# Patient Record
Sex: MALE | Race: WHITE | NOT HISPANIC OR LATINO | Employment: FULL TIME | URBAN - METROPOLITAN AREA
[De-identification: names, ages, dates, MRNs, and addresses within clinical notes are randomized per-mention and may not be internally consistent; named-entity substitution may affect disease eponyms.]

---

## 2023-05-12 ENCOUNTER — TELEPHONE (OUTPATIENT)
Dept: PRIMARY CARE | Facility: CLINIC | Age: 68
End: 2023-05-12

## 2023-05-13 DIAGNOSIS — Z00.00 HEALTH CARE MAINTENANCE: Primary | ICD-10-CM

## 2023-05-13 RX ORDER — AMOXICILLIN 875 MG/1
875 TABLET, FILM COATED ORAL 2 TIMES DAILY
Qty: 10 TABLET | Refills: 0 | Status: SHIPPED | OUTPATIENT
Start: 2023-05-13 | End: 2023-05-18

## 2023-11-20 ENCOUNTER — OFFICE VISIT (OUTPATIENT)
Dept: PRIMARY CARE | Facility: CLINIC | Age: 68
End: 2023-11-20
Payer: MEDICARE

## 2023-11-20 VITALS — SYSTOLIC BLOOD PRESSURE: 115 MMHG | DIASTOLIC BLOOD PRESSURE: 73 MMHG

## 2023-11-20 DIAGNOSIS — E11.9 TYPE 2 DIABETES MELLITUS WITHOUT COMPLICATION, WITHOUT LONG-TERM CURRENT USE OF INSULIN (MULTI): ICD-10-CM

## 2023-11-20 DIAGNOSIS — I10 PRIMARY HYPERTENSION: ICD-10-CM

## 2023-11-20 DIAGNOSIS — Z00.00 HEALTH CARE MAINTENANCE: Primary | ICD-10-CM

## 2023-11-20 DIAGNOSIS — J01.00 ACUTE MAXILLARY SINUSITIS, RECURRENCE NOT SPECIFIED: ICD-10-CM

## 2023-11-20 PROCEDURE — 3074F SYST BP LT 130 MM HG: CPT | Performed by: INTERNAL MEDICINE

## 2023-11-20 PROCEDURE — 1036F TOBACCO NON-USER: CPT | Performed by: INTERNAL MEDICINE

## 2023-11-20 PROCEDURE — 99213 OFFICE O/P EST LOW 20 MIN: CPT | Performed by: INTERNAL MEDICINE

## 2023-11-20 PROCEDURE — 1159F MED LIST DOCD IN RCRD: CPT | Performed by: INTERNAL MEDICINE

## 2023-11-20 PROCEDURE — 3078F DIAST BP <80 MM HG: CPT | Performed by: INTERNAL MEDICINE

## 2023-11-20 PROCEDURE — 1126F AMNT PAIN NOTED NONE PRSNT: CPT | Performed by: INTERNAL MEDICINE

## 2023-11-20 PROCEDURE — 4010F ACE/ARB THERAPY RXD/TAKEN: CPT | Performed by: INTERNAL MEDICINE

## 2023-11-20 RX ORDER — AMOXICILLIN AND CLAVULANATE POTASSIUM 875; 125 MG/1; MG/1
875 TABLET, FILM COATED ORAL 2 TIMES DAILY
Qty: 14 TABLET | Refills: 0 | Status: SHIPPED | OUTPATIENT
Start: 2023-11-20 | End: 2023-11-27

## 2023-11-20 RX ORDER — PENICILLIN V POTASSIUM 500 MG/1
TABLET, FILM COATED ORAL
Qty: 20 TABLET | Refills: 0 | Status: SHIPPED | OUTPATIENT
Start: 2023-11-20

## 2023-11-21 NOTE — PROGRESS NOTES
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    HPI sick visit same day after hours no staff no chest pain no shortness of breath complains of some sinus and posterior pharyngeal discomfort discussed with blood sugars no polyuria polydipsia no hypoglycemic symptoms     vital signs noted alert and oriented x 3 NCAT no coryza mild if any nares clear discharge OP benign erythema posterior no exudate TM normal opaque bilateral EAC clear bilateral no AC nodes    Review of Systems    Objective   There were no vitals taken for this visit.    Physical Exam no JVD or bruit chest clear to auscultation and percussion no wheezing no crackles CV regular rate and rhythm S1-S2 without murmur gallop or rub extremities no clubbing cyanosis or edema normal distal pulses    Assessment/Plan impression Sino pharyngitis diabetes mellitus hypertension   Plan recheck for regular physical examination and other blood work as needed does not have uvulitis but has TEDDY and some upper respiratory infection did start Augmentin previously so will do 7 more days Augmentin that he has leftover also needs Amoxil for dentistry plan to intervention good diet regular exercise increase water consumption check on blood sugars at home continue with blood pressure medication follow-up as above

## 2023-12-05 PROBLEM — N40.1 BPH WITH OBSTRUCTION/LOWER URINARY TRACT SYMPTOMS: Status: ACTIVE | Noted: 2023-12-05

## 2023-12-05 PROBLEM — I83.893 VARICOSE VEINS OF BOTH LOWER EXTREMITIES WITH COMPLICATIONS: Status: ACTIVE | Noted: 2023-12-05

## 2023-12-05 PROBLEM — I10 HYPERTENSION: Status: ACTIVE | Noted: 2023-12-05

## 2023-12-05 PROBLEM — G47.33 OBSTRUCTIVE SLEEP APNEA: Status: ACTIVE | Noted: 2023-12-05

## 2023-12-05 PROBLEM — H25.13 AGE-RELATED NUCLEAR CATARACT, BILATERAL: Status: ACTIVE | Noted: 2023-12-05

## 2023-12-05 PROBLEM — I25.10 ASHD (ARTERIOSCLEROTIC HEART DISEASE): Status: ACTIVE | Noted: 2023-12-05

## 2023-12-05 PROBLEM — G47.00 INSOMNIA: Status: ACTIVE | Noted: 2023-12-05

## 2023-12-05 PROBLEM — I72.4 POPLITEAL ANEURYSM (CMS-HCC): Status: ACTIVE | Noted: 2023-12-05

## 2023-12-05 PROBLEM — I77.810 DILATED AORTIC ROOT (CMS-HCC): Status: ACTIVE | Noted: 2023-12-05

## 2023-12-05 PROBLEM — N13.8 BPH WITH OBSTRUCTION/LOWER URINARY TRACT SYMPTOMS: Status: ACTIVE | Noted: 2023-12-05

## 2023-12-05 PROBLEM — R29.898 BILATERAL LEG WEAKNESS: Status: ACTIVE | Noted: 2023-12-05

## 2023-12-05 PROBLEM — E11.69 DYSLIPIDEMIA ASSOCIATED WITH TYPE 2 DIABETES MELLITUS (MULTI): Status: ACTIVE | Noted: 2023-12-05

## 2023-12-05 PROBLEM — I67.81 CEREBROVASCULAR INSUFFICIENCY: Status: ACTIVE | Noted: 2023-12-05

## 2023-12-05 PROBLEM — E78.5 DYSLIPIDEMIA ASSOCIATED WITH TYPE 2 DIABETES MELLITUS (MULTI): Status: ACTIVE | Noted: 2023-12-05

## 2023-12-05 PROBLEM — N18.30 CHRONIC KIDNEY DISEASE (CKD), STAGE III (MODERATE) (MULTI): Status: ACTIVE | Noted: 2023-12-05

## 2023-12-05 PROBLEM — S98.139A AMPUTATED TOE (CMS-HCC): Status: ACTIVE | Noted: 2023-12-05

## 2023-12-05 PROBLEM — I48.0 PAROXYSMAL ATRIAL FIBRILLATION (MULTI): Status: ACTIVE | Noted: 2020-02-25

## 2023-12-05 RX ORDER — APIXABAN 5 MG/1
5 TABLET, FILM COATED ORAL EVERY 12 HOURS
COMMUNITY
Start: 2023-09-26 | End: 2023-12-26 | Stop reason: SDUPTHER

## 2023-12-05 RX ORDER — DILTIAZEM HYDROCHLORIDE 60 MG/1
60 TABLET, FILM COATED ORAL
COMMUNITY
Start: 2023-09-20 | End: 2023-12-26 | Stop reason: SDUPTHER

## 2023-12-05 RX ORDER — CALCIUM CARBONATE 200(500)MG
TABLET,CHEWABLE ORAL
COMMUNITY

## 2023-12-05 RX ORDER — NITROGLYCERIN 0.4 MG/1
TABLET SUBLINGUAL
COMMUNITY
Start: 2017-12-19

## 2023-12-05 RX ORDER — METFORMIN HYDROCHLORIDE 500 MG/1
1000 TABLET, EXTENDED RELEASE ORAL 2 TIMES DAILY
COMMUNITY
End: 2024-03-07

## 2023-12-05 RX ORDER — LISINOPRIL 20 MG/1
20 TABLET ORAL DAILY
COMMUNITY
Start: 2023-11-18 | End: 2023-12-26 | Stop reason: SDUPTHER

## 2023-12-05 RX ORDER — ZOLPIDEM TARTRATE 5 MG/1
0.5 TABLET ORAL NIGHTLY PRN
COMMUNITY

## 2023-12-05 RX ORDER — GABAPENTIN 800 MG/1
800 TABLET ORAL 3 TIMES DAILY
COMMUNITY
Start: 2023-05-21 | End: 2024-03-27 | Stop reason: SDUPTHER

## 2023-12-05 RX ORDER — TRIAMCINOLONE ACETONIDE 1 MG/G
CREAM TOPICAL
COMMUNITY
Start: 2014-12-03 | End: 2023-12-06 | Stop reason: ALTCHOICE

## 2023-12-05 RX ORDER — MELATONIN 10 MG
10 CAPSULE ORAL NIGHTLY PRN
COMMUNITY

## 2023-12-05 RX ORDER — ROSUVASTATIN CALCIUM 20 MG/1
1 TABLET, COATED ORAL DAILY
COMMUNITY
Start: 2018-02-05 | End: 2023-12-26 | Stop reason: SDUPTHER

## 2023-12-05 RX ORDER — DRONEDARONE 400 MG/1
400 TABLET, FILM COATED ORAL
COMMUNITY
Start: 2023-09-27 | End: 2023-12-26 | Stop reason: SDUPTHER

## 2023-12-06 ENCOUNTER — OFFICE VISIT (OUTPATIENT)
Dept: CARDIOLOGY | Facility: CLINIC | Age: 68
End: 2023-12-06
Payer: MEDICARE

## 2023-12-06 VITALS
HEART RATE: 54 BPM | SYSTOLIC BLOOD PRESSURE: 113 MMHG | WEIGHT: 171 LBS | BODY MASS INDEX: 26.84 KG/M2 | HEIGHT: 67 IN | DIASTOLIC BLOOD PRESSURE: 65 MMHG | OXYGEN SATURATION: 96 %

## 2023-12-06 DIAGNOSIS — E11.69 DYSLIPIDEMIA ASSOCIATED WITH TYPE 2 DIABETES MELLITUS (MULTI): ICD-10-CM

## 2023-12-06 DIAGNOSIS — I48.0 PAROXYSMAL ATRIAL FIBRILLATION (MULTI): ICD-10-CM

## 2023-12-06 DIAGNOSIS — E78.5 DYSLIPIDEMIA ASSOCIATED WITH TYPE 2 DIABETES MELLITUS (MULTI): ICD-10-CM

## 2023-12-06 DIAGNOSIS — I25.10 ASHD (ARTERIOSCLEROTIC HEART DISEASE): Primary | ICD-10-CM

## 2023-12-06 DIAGNOSIS — N40.1 BPH WITH OBSTRUCTION/LOWER URINARY TRACT SYMPTOMS: ICD-10-CM

## 2023-12-06 DIAGNOSIS — E11.9 TYPE 2 DIABETES MELLITUS WITHOUT COMPLICATION, WITHOUT LONG-TERM CURRENT USE OF INSULIN (MULTI): ICD-10-CM

## 2023-12-06 DIAGNOSIS — N13.8 BPH WITH OBSTRUCTION/LOWER URINARY TRACT SYMPTOMS: ICD-10-CM

## 2023-12-06 DIAGNOSIS — I10 PRIMARY HYPERTENSION: ICD-10-CM

## 2023-12-06 PROCEDURE — 99214 OFFICE O/P EST MOD 30 MIN: CPT | Performed by: INTERNAL MEDICINE

## 2023-12-06 PROCEDURE — 1126F AMNT PAIN NOTED NONE PRSNT: CPT | Performed by: INTERNAL MEDICINE

## 2023-12-06 PROCEDURE — 4010F ACE/ARB THERAPY RXD/TAKEN: CPT | Performed by: INTERNAL MEDICINE

## 2023-12-06 PROCEDURE — 93005 ELECTROCARDIOGRAM TRACING: CPT | Mod: PO | Performed by: INTERNAL MEDICINE

## 2023-12-06 PROCEDURE — 3074F SYST BP LT 130 MM HG: CPT | Performed by: INTERNAL MEDICINE

## 2023-12-06 PROCEDURE — 93010 ELECTROCARDIOGRAM REPORT: CPT | Performed by: INTERNAL MEDICINE

## 2023-12-06 PROCEDURE — 1159F MED LIST DOCD IN RCRD: CPT | Performed by: INTERNAL MEDICINE

## 2023-12-06 PROCEDURE — 1036F TOBACCO NON-USER: CPT | Performed by: INTERNAL MEDICINE

## 2023-12-06 PROCEDURE — 3078F DIAST BP <80 MM HG: CPT | Performed by: INTERNAL MEDICINE

## 2023-12-06 PROCEDURE — 99214 OFFICE O/P EST MOD 30 MIN: CPT | Mod: PO | Performed by: INTERNAL MEDICINE

## 2023-12-06 ASSESSMENT — COLUMBIA-SUICIDE SEVERITY RATING SCALE - C-SSRS
6. HAVE YOU EVER DONE ANYTHING, STARTED TO DO ANYTHING, OR PREPARED TO DO ANYTHING TO END YOUR LIFE?: NO
2. HAVE YOU ACTUALLY HAD ANY THOUGHTS OF KILLING YOURSELF?: NO
1. IN THE PAST MONTH, HAVE YOU WISHED YOU WERE DEAD OR WISHED YOU COULD GO TO SLEEP AND NOT WAKE UP?: NO

## 2023-12-06 ASSESSMENT — ENCOUNTER SYMPTOMS
LOSS OF SENSATION IN FEET: 1
OCCASIONAL FEELINGS OF UNSTEADINESS: 0
DEPRESSION: 0

## 2023-12-06 ASSESSMENT — PATIENT HEALTH QUESTIONNAIRE - PHQ9
SUM OF ALL RESPONSES TO PHQ9 QUESTIONS 1 AND 2: 0
1. LITTLE INTEREST OR PLEASURE IN DOING THINGS: NOT AT ALL
2. FEELING DOWN, DEPRESSED OR HOPELESS: NOT AT ALL

## 2023-12-06 ASSESSMENT — PAIN SCALES - GENERAL: PAINLEVEL: 0-NO PAIN

## 2023-12-06 NOTE — PROGRESS NOTES
Primary Care Physician: Milan Haynes MD  Date of Visit: 12/06/2023  4:20 PM EST  [place] [facility] [place] [address]   Last office visit: Visit date not found     Chief Complaint:     Follow-up annual reevaluation.    HPI/Summary  Andrey Reaves is a 68 y.o. male who presents for followup cardiology evaluation.     He presents with TEDDY, atrial fibrillation, moderate nonobstructive CAD, hypertension, diabetes and hypercholesterolemia.  Atrial fibrillation has largely resolved with effective treatment of sleep apnea.  When he develops atrial fibrillation, he generally takes a dose of short acting diltiazem for rate control, and most episodes have been self-limited lasting for 24 to 48 hours.  An echocardiogram in October, 2021 showed vigorous LV contractility, no valvular heart disease, modest dilatation of the aortic root and slight dilatation of the ascending aorta measuring 3.8 cm.    He continues on Eliquis, lisinopril, metformin, Multaq, and rosuvastatin.  A recent lipid panel showed an LDL of only 28 mg/dL.  The hemoglobin A1c was 6.1%, but was last performed on August 19, 2022.    He feels well.  No anticoagulation related complications.  No chest pain.  No significant palpitations to suggest recurrent atrial fibrillation.  He takes a dose of diltiazem, as needed but this has been very infrequent.      Specialty Problems          Cardiology Problems    Paroxysmal atrial fibrillation (CMS/HCC)    ASHD (arteriosclerotic heart disease)     17.,  2019: 40% proximal LAD, 50% mid circumflex, mild RCA.  Medical management.         Dilated aortic root (CMS/HCC)    Dyslipidemia associated with type 2 diabetes mellitus (CMS/HCC)    Hypertension    Popliteal aneurysm (CMS/HCC)    Varicose veins of both lower extremities with complications        Past Medical History:   Diagnosis Date    Essential (primary) hypertension 12/29/2022    Hypertension    Osteoarthritis of knee, unspecified     Degenerative arthritis of  knee    Personal history of other endocrine, nutritional and metabolic disease     History of hyperlipidemia    Personal history of other specified conditions 04/04/2020    History of insomnia    Type 2 diabetes mellitus without complications (CMS/[facility]) 12/06/2022    Diabetes mellitus          Past Surgical History:   Procedure Laterality Date    CT ANGIO CORONARY ART WITH HEARTFLOW IF SCORE >30%  12/18/2017    CT HEART CORONARY ANGIOGRAM 12/18/2017 [facility]    HERNIA REPAIR  05/21/2015    Hernia Repair    KNEE ARTHROSCOPY W/ DEBRIDEMENT  05/21/2015    Arthroscopy Knee Right    MR HEAD ANGIO WO IV CONTRAST  2/9/2017    MR HEAD ANGIO WO IV CONTRAST 2/9/2017 [facility]    MR NECK ANGIO WO IV CONTRAST  2/9/2017    MR NECK ANGIO WO IV CONTRAST 2/9/2017 [facility]    OTHER SURGICAL HISTORY  05/21/2015    Surgery Foot Amputation Toe At MTP Joint    OTHER SURGICAL HISTORY  05/21/2015    Closed Treatment Of Clavicular Fracture On The Left    OTHER SURGICAL HISTORY  05/21/2015    Bankart Capsulorrhaphy            Social History     Tobacco Use    Smoking status: Never    Smokeless tobacco: Never   Substance Use Topics    Alcohol use: Yes    Drug use: Never        Physical Activity: cycles, hikes.             Allergies   Allergen Reactions    Hydrocodone Unknown         Current Outpatient Medications   Medication Instructions    calcium carbonate (Tums) 200 mg calcium chewable tablet oral    dilTIAZem (Cardizem) 60 mg immediate release tablet 1 tablet (60 mg). As needed.    Eliquis 5 mg, oral, Every 12 hours    gabapentin (NEURONTIN) 800 mg, oral, 3 times daily    lisinopril 20 mg, oral, Daily    melatonin 10 mg, oral, Nightly PRN    metFORMIN XR (GLUCOPHAGE-XR) 1,000 mg, oral, 2 times daily    Multaq 400 mg, oral, 2 times daily after meals    nitroglycerin (Nitrostat) 0.4 mg SL tablet PLACE 1 TABLET UNDER THE TONGUE EVERY 5 MINUTES FOR UP TO 3 DOSES AS NEEDED FOR CHEST PAIN.CALL 911 IF PAIN  "PERSISTS.    penicillin v potassium (Veetid) 500 mg tablet Take 4 tablets 1 hour prior to dental procedure    rosuvastatin (Crestor) 20 mg tablet 1 tablet, oral, Daily    zolpidem (Ambien) 5 mg tablet 0.5 tablets, oral, Nightly PRN       ROS     Vital Signs:  Vitals:    12/06/23 1625   BP: 113/65   BP Location: Left arm   Patient Position: Sitting   BP Cuff Size: Adult   Pulse: 54   SpO2: 96%   Weight: 77.6 kg (171 lb)   Height: 1.702 m (5' 7\")     Wt Readings from Last 5 Encounters:   12/06/23 77.6 kg (171 lb)   12/29/22 80.3 kg (177 lb)   12/06/22 78.9 kg (174 lb)   10/19/22 78.9 kg (174 lb)   11/07/21 78 kg (172 lb)     Body mass index is 26.78 kg/m².     Physical Exam:    He appeared well.  The neck veins were not apparent.  No carotid bruits.  Clear lung fields.  Heart sounds regular, with no murmurs or gallops.  Abdomen slightly obese but nontender.  No edema.     Last Labs:  CMP:  Recent Labs     08/19/22  1115 09/04/20  1210 02/11/20  1504 03/05/19  0556 02/05/19  1333    141 141 137 140   K 4.7 4.6 4.7 4.6 5.1    106 107 101 102   CO2 27 26 27 22 25   ANIONGAP 15 14 12 19 18   BUN 23 16 20 28* 24*   CREATININE 1.24 1.21 1.12 1.25 1.26   GLUCOSE 100* 112* 101* 153* 142*     Recent Labs     01/16/18  0430 12/01/17  1626   ALBUMIN 3.9 4.5   ALKPHOS 42 65   ALT 27 30   AST 15 16   BILITOT 0.7 0.7     CBC:  Recent Labs     08/19/22  1115 02/11/20  1504 03/05/19  0556 02/05/19  1333 01/17/18  1958   WBC 5.4 6.3 15.8* 7.2 5.7   HGB 15.2 15.1 12.5* 16.0 14.0   HCT 46.9 47.4 38.8* 49.5 41.8    199 190 200 183   MCV 92 91 90 91 91     COAG:   Recent Labs     02/05/19  1333 01/16/18  0430   INR 1.4* 1.4*     HEME/ENDO:  Recent Labs     08/19/22  1111 09/04/20  1210 02/11/20  1504 11/30/18  1025   HGBA1C 6.1* 6.2 6.3 6.4      CARDIAC: No results for input(s): \"LDH\", \"CKMB\", \"TROPHS\", \"BNP\" in the last 72178 hours.    No lab exists for component: \"CK\", \"CKMBP\"  Recent Labs     08/19/22  1115 " 09/04/20  1210 11/30/18  1012   CHOL 91 89 112   LDLF 28 22 33   HDL 35.3* 35.3* 32.6*   TRIG 139 158* 232*       Last Cardiology Tests:    ECG:    Performed a tracing today.  Sinus bradycardia, 54/min.  Otherwise normal ECG.    Echo:   Dec 7, 2021  CONCLUSIONS:   1. The left ventricular systolic function is normal with a 65-70% estimated ejection fraction.   2. The aortic root is 4.0 cm; the ascending aorta is 3.8 cm.    Cath:      Stress Test:  Stress Results:  No results found for this or any previous visit from the past 365 days.         Cardiac Imaging:        Assessment/Plan     Today, we reviewed the patient's problems.  Atrial fibrillation has not been an issue after careful control of obstructive sleep apnea.  However, we are comfortable continuing long-term anticoagulation, given the multiple risk factors.  Blood pressures are at goal.  Renal function is preserved.  He has not seen his primary care provider recently, so we placed orders for routine laboratory testing.  Medications will be renewed as appropriate.  No indication for a stress test at this time.      Orders:  Orders Placed This Encounter   Procedures    Prostate Specific Antigen, Screen    Hemoglobin A1C    Comprehensive Metabolic Panel    Thyroid Stimulating Hormone    Lipid Panel    ECG 12 lead (Clinic Performed)      Followup Appts:  No future appointments.        ____________________________________________________________  Edvin José MD    Senior Attending Physician  [facility] [place]  [facility]    [facility] Chair for Cardiovascular Excellence  [facility] [employer]

## 2023-12-21 ENCOUNTER — LAB (OUTPATIENT)
Dept: LAB | Facility: LAB | Age: 68
End: 2023-12-21
Payer: MEDICARE

## 2023-12-21 DIAGNOSIS — E11.69 DYSLIPIDEMIA ASSOCIATED WITH TYPE 2 DIABETES MELLITUS (MULTI): ICD-10-CM

## 2023-12-21 DIAGNOSIS — E78.5 DYSLIPIDEMIA ASSOCIATED WITH TYPE 2 DIABETES MELLITUS (MULTI): ICD-10-CM

## 2023-12-21 DIAGNOSIS — I25.10 ASHD (ARTERIOSCLEROTIC HEART DISEASE): ICD-10-CM

## 2023-12-21 DIAGNOSIS — N40.1 BPH WITH OBSTRUCTION/LOWER URINARY TRACT SYMPTOMS: ICD-10-CM

## 2023-12-21 DIAGNOSIS — I10 PRIMARY HYPERTENSION: ICD-10-CM

## 2023-12-21 DIAGNOSIS — N13.8 BPH WITH OBSTRUCTION/LOWER URINARY TRACT SYMPTOMS: ICD-10-CM

## 2023-12-21 LAB
ALBUMIN SERPL BCP-MCNC: 4.7 G/DL (ref 3.4–5)
ALP SERPL-CCNC: 53 U/L (ref 33–136)
ALT SERPL W P-5'-P-CCNC: 63 U/L (ref 10–52)
ANION GAP SERPL CALC-SCNC: 13 MMOL/L (ref 10–20)
AST SERPL W P-5'-P-CCNC: 49 U/L (ref 9–39)
ATRIAL RATE: 54 BPM
BILIRUB SERPL-MCNC: 0.8 MG/DL (ref 0–1.2)
BUN SERPL-MCNC: 18 MG/DL (ref 6–23)
CALCIUM SERPL-MCNC: 9.5 MG/DL (ref 8.6–10.3)
CHLORIDE SERPL-SCNC: 103 MMOL/L (ref 98–107)
CHOLEST SERPL-MCNC: 109 MG/DL (ref 0–199)
CHOLESTEROL/HDL RATIO: 2.9
CO2 SERPL-SCNC: 28 MMOL/L (ref 21–32)
CREAT SERPL-MCNC: 1.17 MG/DL (ref 0.5–1.3)
EST. AVERAGE GLUCOSE BLD GHB EST-MCNC: 143 MG/DL
GFR SERPL CREATININE-BSD FRML MDRD: 67 ML/MIN/1.73M*2
GLUCOSE SERPL-MCNC: 113 MG/DL (ref 74–99)
HBA1C MFR BLD: 6.6 %
HDLC SERPL-MCNC: 37.1 MG/DL
LDLC SERPL CALC-MCNC: 29 MG/DL
NON HDL CHOLESTEROL: 72 MG/DL (ref 0–149)
P AXIS: 41 DEGREES
P OFFSET: 196 MS
P ONSET: 129 MS
POTASSIUM SERPL-SCNC: 4.8 MMOL/L (ref 3.5–5.3)
PR INTERVAL: 194 MS
PROT SERPL-MCNC: 6.6 G/DL (ref 6.4–8.2)
PSA SERPL-MCNC: 0.95 NG/ML
Q ONSET: 226 MS
QRS COUNT: 9 BEATS
QRS DURATION: 90 MS
QT INTERVAL: 438 MS
QTC CALCULATION(BAZETT): 415 MS
QTC FREDERICIA: 422 MS
R AXIS: 9 DEGREES
SODIUM SERPL-SCNC: 139 MMOL/L (ref 136–145)
T AXIS: 29 DEGREES
T OFFSET: 445 MS
TRIGL SERPL-MCNC: 216 MG/DL (ref 0–149)
TSH SERPL-ACNC: 3.52 MIU/L (ref 0.44–3.98)
VENTRICULAR RATE: 54 BPM
VLDL: 43 MG/DL (ref 0–40)

## 2023-12-21 PROCEDURE — 36415 COLL VENOUS BLD VENIPUNCTURE: CPT

## 2023-12-21 PROCEDURE — 80061 LIPID PANEL: CPT

## 2023-12-21 PROCEDURE — 80053 COMPREHEN METABOLIC PANEL: CPT

## 2023-12-21 PROCEDURE — 83036 HEMOGLOBIN GLYCOSYLATED A1C: CPT

## 2023-12-21 PROCEDURE — 84153 ASSAY OF PSA TOTAL: CPT

## 2023-12-21 PROCEDURE — 84443 ASSAY THYROID STIM HORMONE: CPT

## 2023-12-24 ENCOUNTER — PATIENT MESSAGE (OUTPATIENT)
Dept: CARDIOLOGY | Facility: CLINIC | Age: 68
End: 2023-12-24
Payer: MEDICARE

## 2023-12-24 DIAGNOSIS — E11.69 DYSLIPIDEMIA ASSOCIATED WITH TYPE 2 DIABETES MELLITUS (MULTI): ICD-10-CM

## 2023-12-24 DIAGNOSIS — I25.10 ASHD (ARTERIOSCLEROTIC HEART DISEASE): Primary | ICD-10-CM

## 2023-12-24 DIAGNOSIS — E78.5 DYSLIPIDEMIA ASSOCIATED WITH TYPE 2 DIABETES MELLITUS (MULTI): ICD-10-CM

## 2023-12-26 ENCOUNTER — LAB (OUTPATIENT)
Dept: LAB | Facility: LAB | Age: 68
End: 2023-12-26
Payer: MEDICARE

## 2023-12-26 ENCOUNTER — OFFICE VISIT (OUTPATIENT)
Dept: OTOLARYNGOLOGY | Facility: CLINIC | Age: 68
End: 2023-12-26
Payer: MEDICARE

## 2023-12-26 VITALS — BODY MASS INDEX: 27.15 KG/M2 | WEIGHT: 173 LBS | HEIGHT: 67 IN

## 2023-12-26 DIAGNOSIS — J38.4 VOCAL CORD EDEMA: Primary | ICD-10-CM

## 2023-12-26 DIAGNOSIS — I25.10 ASHD (ARTERIOSCLEROTIC HEART DISEASE): Primary | ICD-10-CM

## 2023-12-26 DIAGNOSIS — E78.5 DYSLIPIDEMIA ASSOCIATED WITH TYPE 2 DIABETES MELLITUS (MULTI): ICD-10-CM

## 2023-12-26 DIAGNOSIS — E11.69 DYSLIPIDEMIA ASSOCIATED WITH TYPE 2 DIABETES MELLITUS (MULTI): ICD-10-CM

## 2023-12-26 DIAGNOSIS — K13.79 UVULAR EDEMA: ICD-10-CM

## 2023-12-26 DIAGNOSIS — I25.10 ASHD (ARTERIOSCLEROTIC HEART DISEASE): ICD-10-CM

## 2023-12-26 DIAGNOSIS — R09.A2 GLOBUS PHARYNGEUS: ICD-10-CM

## 2023-12-26 DIAGNOSIS — I48.0 PAROXYSMAL ATRIAL FIBRILLATION (MULTI): ICD-10-CM

## 2023-12-26 LAB
ERYTHROCYTE [DISTWIDTH] IN BLOOD BY AUTOMATED COUNT: 13.8 % (ref 11.5–14.5)
HCT VFR BLD AUTO: 47.5 % (ref 41–52)
HGB BLD-MCNC: 15.3 G/DL (ref 13.5–17.5)
MCH RBC QN AUTO: 29.5 PG (ref 26–34)
MCHC RBC AUTO-ENTMCNC: 32.2 G/DL (ref 32–36)
MCV RBC AUTO: 92 FL (ref 80–100)
NRBC BLD-RTO: 0 /100 WBCS (ref 0–0)
PLATELET # BLD AUTO: 196 X10*3/UL (ref 150–450)
RBC # BLD AUTO: 5.19 X10*6/UL (ref 4.5–5.9)
WBC # BLD AUTO: 8.1 X10*3/UL (ref 4.4–11.3)

## 2023-12-26 PROCEDURE — 1036F TOBACCO NON-USER: CPT | Performed by: OTOLARYNGOLOGY

## 2023-12-26 PROCEDURE — 1160F RVW MEDS BY RX/DR IN RCRD: CPT | Performed by: OTOLARYNGOLOGY

## 2023-12-26 PROCEDURE — 99213 OFFICE O/P EST LOW 20 MIN: CPT | Performed by: OTOLARYNGOLOGY

## 2023-12-26 PROCEDURE — 3044F HG A1C LEVEL LT 7.0%: CPT | Performed by: OTOLARYNGOLOGY

## 2023-12-26 PROCEDURE — 4010F ACE/ARB THERAPY RXD/TAKEN: CPT | Performed by: OTOLARYNGOLOGY

## 2023-12-26 PROCEDURE — 1159F MED LIST DOCD IN RCRD: CPT | Performed by: OTOLARYNGOLOGY

## 2023-12-26 PROCEDURE — 1126F AMNT PAIN NOTED NONE PRSNT: CPT | Performed by: OTOLARYNGOLOGY

## 2023-12-26 PROCEDURE — 3048F LDL-C <100 MG/DL: CPT | Performed by: OTOLARYNGOLOGY

## 2023-12-26 PROCEDURE — 31575 DIAGNOSTIC LARYNGOSCOPY: CPT | Performed by: OTOLARYNGOLOGY

## 2023-12-26 PROCEDURE — 36415 COLL VENOUS BLD VENIPUNCTURE: CPT

## 2023-12-26 PROCEDURE — 85027 COMPLETE CBC AUTOMATED: CPT

## 2023-12-26 RX ORDER — APIXABAN 5 MG/1
5 TABLET, FILM COATED ORAL EVERY 12 HOURS
Qty: 180 TABLET | Refills: 3 | Status: SHIPPED | OUTPATIENT
Start: 2023-12-26 | End: 2024-12-25

## 2023-12-26 RX ORDER — DILTIAZEM HYDROCHLORIDE 60 MG/1
TABLET, FILM COATED ORAL
Qty: 30 TABLET | Refills: 2 | Status: SHIPPED | OUTPATIENT
Start: 2023-12-26

## 2023-12-26 RX ORDER — DRONEDARONE 400 MG/1
400 TABLET, FILM COATED ORAL
Qty: 180 TABLET | Refills: 3 | Status: SHIPPED | OUTPATIENT
Start: 2023-12-26 | End: 2024-12-25

## 2023-12-26 RX ORDER — ROSUVASTATIN CALCIUM 20 MG/1
20 TABLET, COATED ORAL DAILY
Qty: 90 TABLET | Refills: 3 | Status: SHIPPED | OUTPATIENT
Start: 2023-12-26 | End: 2024-12-25

## 2023-12-26 RX ORDER — LISINOPRIL 20 MG/1
20 TABLET ORAL DAILY
Qty: 90 TABLET | Refills: 3 | Status: SHIPPED | OUTPATIENT
Start: 2023-12-26 | End: 2024-12-25

## 2023-12-26 ASSESSMENT — PATIENT HEALTH QUESTIONNAIRE - PHQ9
2. FEELING DOWN, DEPRESSED OR HOPELESS: NOT AT ALL
SUM OF ALL RESPONSES TO PHQ9 QUESTIONS 1 AND 2: 0
1. LITTLE INTEREST OR PLEASURE IN DOING THINGS: NOT AT ALL

## 2023-12-26 NOTE — PROGRESS NOTES
Chief Complaint  Chief Complaint   Patient presents with    Dysphagia        Pertinent History:  Previously followed by Dr. Sprague    Interval History (2019).   He reports a history of TEDDY/afib/on eliquis. He had an URI one month ago. He noted a sensation of tightness in his throat. He woke up this morning with a sensation of dry mouth with a sensation of fullness in his palate. He was able to breath but felt it was effortful. He has a sensation of tightness that made swallowing uncomfortable. He is able to drink liquids. He has pain in the anterior neck- dull. He has not tried solids this morning. He checked his mouth and noted swelling. The patient will be travelling to [place] to visit his grandchild - and is concerned about leaving. No fever or chills.  He has a history of chronic sinusitis.     Exam:  VOICE: mild raspy hoarseness   RESPIRATION: Breathing comfortably, no stridor.    ORAL CAVITY/OROPHARYNX/LIPS: Normal mucous membranes, normal floor of mouth/tongue/OP, no masses or lesions are noted.  Possible hemorrhage on the tip of the uvula, left greater than right.   SKIN: Neck skin is without scar or injury.    PSYCH: Alert and oriented with appropriate mood and affect.       PROCEDURE NOTE:  Recommended flexible laryngoscopy.  Risks, benefits,  and alternatives were explained.   wished to proceed and provides verbal consent.     PROCEDURE:  Flexible Laryngoscopy, CPT 61712   POSTPROCEDURE DIAGNOSIS: globus    INDICATIONS: Inability to tolerate mirror exam or abnormal findings on mirror, Flexible Laryngoscopy/Stroboscopy performed to assess one of the followin. Diagnosis of symptomatic disorder involving the voice, swallow, upper aerodigestive tract, including TEDDY disorders, or  2. Preoperative evaluation of vocal cord function for individuals undergoing surgery where the RLN or vagus nerves are at risk of injury, or  3. Further evaluation of abnormalities of the upper aerodigestive tract discovered by  another modality, such as CT, MRI, bronchoscopy or EGD    Description of Procedure:    After adequate afrin and lidocaine spray, I advanced the endoscope.  Visualization of the nasopharynx, vallecula, posterior pharyngeal walls, pyriform, epiglottis and post cricoid areas was unremarkable.  The following laryngeal findings were noted:    vocal cord movement was normal   closure was complete   Small hemorrhage and mild edema of the uvula, posterior left uvula with erythema and hemorrhage at the tip  Mild edema in the posterior vocal cords  interarytenoid edema was none   lesions were none   the subglottis was widely patent  Pharyngeal wall squeeze was normal     Procedure well tolerated.    Assessment and Plan:  This is an initial visit for me for throat discomfort with clinical findings of mild edema and hemorrhage of the uvula tip and mild posterior vocal cord edema.  This is worsened by blood thinners and throat clearing and possible snoring.     We discussed:  He was advised to refrain from spicy foods, fatty foods, and decrease his voice volume and amount of talking. work on refraining from throat clearing. No treatment with steroids at this time as this could exacerbate the hemorrhage. Wear CPAP to assure his snoring is not causing trauma to the uvula  He will decrease his Eliquis dose from 5 mg BID to 5 mg every day for a few days to assure he is not noticing enlargement of the area.    He was advised to coat his pills in apple sauce to avoid incidences of impaction.  4.   Stay today to assure no increase in size of area - take a picture and check it to assure it is resolving.  If no change - ok to travel tomorrow.  FU as needed  The patient's questions were answered.    Scribe Attestation  By signing my name below, I, Becca Hahn , [place] attest that this documentation has been prepared under the direction and in the presence of Cyndi Kenyon MD.

## 2023-12-26 NOTE — LETTER
December 26, 2023     Milan Haynes MD  [address]  [facility]  [facility] [address]    Patient: Doctor CHANDLER Reaves   YOB: 1955   Date of Visit: 12/26/2023       Dear Dr. Milan Haynes MD:    Thank you for referring Doctor Jean Paul to me for evaluation. Below are my notes for this consultation.  If you have questions, please do not hesitate to call me. I look forward to following your patient along with you.       Sincerely,     Cyndi Kenyon MD      CC: No Recipients  ______________________________________________________________________________________    Chief Complaint  Chief Complaint   Patient presents with   • Dysphagia        Pertinent History:  Previously followed by Dr. Sprague    Interval History (2019).   He reports a history of TEDDY/afib/on eliquis. He had an URI one month ago. He noted a sensation of tightness in his throat. He woke up this morning with a sensation of dry mouth with a sensation of fullness in his palate. He was able to breath but felt it was effortful. He has a sensation of tightness that made swallowing uncomfortable. He is able to drink liquids. He has pain in the anterior neck- dull. He has not tried solids this morning. He checked his mouth and noted swelling. The patient will be travelling to [place] to visit his grandchild - and is concerned about leaving. No fever or chills.  He has a history of chronic sinusitis.     Exam:  VOICE: mild raspy hoarseness   RESPIRATION: Breathing comfortably, no stridor.    ORAL CAVITY/OROPHARYNX/LIPS: Normal mucous membranes, normal floor of mouth/tongue/OP, no masses or lesions are noted.  Possible hemorrhage on the tip of the uvula, left greater than right.   SKIN: Neck skin is without scar or injury.    PSYCH: Alert and oriented with appropriate mood and affect.       PROCEDURE NOTE:  Recommended flexible laryngoscopy.  Risks, benefits,  and alternatives were explained.   wished to proceed  and provides verbal consent.     PROCEDURE:  Flexible Laryngoscopy, CPT 81889   POSTPROCEDURE DIAGNOSIS: globus    INDICATIONS: Inability to tolerate mirror exam or abnormal findings on mirror, Flexible Laryngoscopy/Stroboscopy performed to assess one of the followin. Diagnosis of symptomatic disorder involving the voice, swallow, upper aerodigestive tract, including TEDDY disorders, or  2. Preoperative evaluation of vocal cord function for individuals undergoing surgery where the RLN or vagus nerves are at risk of injury, or  3. Further evaluation of abnormalities of the upper aerodigestive tract discovered by another modality, such as CT, MRI, bronchoscopy or EGD    Description of Procedure:    After adequate afrin and lidocaine spray, I advanced the endoscope.  Visualization of the nasopharynx, vallecula, posterior pharyngeal walls, pyriform, epiglottis and post cricoid areas was unremarkable.  The following laryngeal findings were noted:    vocal cord movement was normal   closure was complete   Small hemorrhage and mild edema of the uvula, posterior left uvula with erythema and hemorrhage at the tip  Mild edema in the posterior vocal cords  interarytenoid edema was none   lesions were none   the subglottis was widely patent  Pharyngeal wall squeeze was normal     Procedure well tolerated.    Assessment and Plan:  This is an initial visit for me for throat discomfort with clinical findings of mild edema and hemorrhage of the uvula tip and mild posterior vocal cord edema.  This is worsened by blood thinners and throat clearing and possible snoring.     We discussed:  He was advised to refrain from spicy foods, fatty foods, and decrease his voice volume and amount of talking. work on refraining from throat clearing. No treatment with steroids at this time as this could exacerbate the hemorrhage. Wear CPAP to assure his snoring is not causing trauma to the uvula  He will decrease his Eliquis dose from 5 mg BID  to 5 mg every day for a few days to assure he is not noticing enlargement of the area.    He was advised to coat his pills in apple sauce to avoid incidences of impaction.  4.   Stay today to assure no increase in size of area - take a picture and check it to assure it is resolving.  If no change - ok to travel tomorrow.  FU as needed  The patient's questions were answered.    Scribe Attestation  By signing my name below, I, Becca Hahn , Tori attest that this documentation has been prepared under the direction and in the presence of Cyndi Kenyon MD.

## 2024-01-09 DIAGNOSIS — I48.0 PAROXYSMAL ATRIAL FIBRILLATION (MULTI): ICD-10-CM

## 2024-01-09 RX ORDER — DILTIAZEM HYDROCHLORIDE 60 MG/1
TABLET, FILM COATED ORAL
Qty: 90 TABLET | Refills: 1 | OUTPATIENT
Start: 2024-01-09

## 2024-03-04 DIAGNOSIS — Z00.00 ENCOUNTER FOR GENERAL ADULT MEDICAL EXAMINATION WITHOUT ABNORMAL FINDINGS: ICD-10-CM

## 2024-03-07 RX ORDER — METFORMIN HYDROCHLORIDE 500 MG/1
1000 TABLET, EXTENDED RELEASE ORAL 2 TIMES DAILY
Qty: 360 TABLET | Refills: 1 | Status: SHIPPED | OUTPATIENT
Start: 2024-03-07

## 2024-03-27 DIAGNOSIS — M48.061 SPINAL STENOSIS OF LUMBAR REGION, UNSPECIFIED WHETHER NEUROGENIC CLAUDICATION PRESENT: ICD-10-CM

## 2024-03-27 RX ORDER — GABAPENTIN 800 MG/1
800 TABLET ORAL 3 TIMES DAILY
Qty: 270 TABLET | Refills: 3 | Status: SHIPPED | OUTPATIENT
Start: 2024-03-27 | End: 2025-03-27

## 2024-04-09 NOTE — PROGRESS NOTES
NPV     HISTORY OF PRESENT ILLNESS:   Andrey Reaves is a 69 y.o. male who is being seen as a new patient today for evaluation of urinary sxs.    PAST MEDICAL HISTORY:  Past Medical History:   Diagnosis Date    Essential (primary) hypertension 12/29/2022    Hypertension    Osteoarthritis of knee, unspecified     Degenerative arthritis of knee    Personal history of other endocrine, nutritional and metabolic disease     History of hyperlipidemia    Personal history of other specified conditions 04/04/2020    History of insomnia    Type 2 diabetes mellitus without complications (CMS/[facility]) 12/06/2022    Diabetes mellitus       PAST SURGICAL HISTORY:  Past Surgical History:   Procedure Laterality Date    CT ANGIO CORONARY ART WITH HEARTFLOW IF SCORE >30%  12/18/2017    CT HEART CORONARY ANGIOGRAM 12/18/2017 [facility]    HERNIA REPAIR  05/21/2015    Hernia Repair    KNEE ARTHROSCOPY W/ DEBRIDEMENT  05/21/2015    Arthroscopy Knee Right    MR HEAD ANGIO WO IV CONTRAST  2/9/2017    MR HEAD ANGIO WO IV CONTRAST 2/9/2017 [facility]    MR NECK ANGIO WO IV CONTRAST  2/9/2017    MR NECK ANGIO WO IV CONTRAST 2/9/2017 [facility]    OTHER SURGICAL HISTORY  05/21/2015    Surgery Foot Amputation Toe At MTP Joint    OTHER SURGICAL HISTORY  05/21/2015    Closed Treatment Of Clavicular Fracture On The Left    OTHER SURGICAL HISTORY  05/21/2015    Bankart Capsulorrhaphy        ALLERGIES:   Allergies   Allergen Reactions    Hydrocodone Unknown        MEDICATIONS:   Current Outpatient Medications   Medication Instructions    calcium carbonate (Tums) 200 mg calcium chewable tablet oral    dilTIAZem (Cardizem) 60 mg immediate release tablet 1 tablet (60 mg). As needed.    Eliquis 5 mg, oral, Every 12 hours    gabapentin (NEURONTIN) 800 mg, oral, 3 times daily    lisinopril 20 mg, oral, Daily    melatonin 10 mg, oral, Nightly PRN    metFORMIN XR (GLUCOPHAGE-XR) 1,000 mg, oral, 2 times daily    Multaq 400 mg,  "oral, 2 times daily after meals    nitroglycerin (Nitrostat) 0.4 mg SL tablet PLACE 1 TABLET UNDER THE TONGUE EVERY 5 MINUTES FOR UP TO 3 DOSES AS NEEDED FOR CHEST PAIN.CALL 911 IF PAIN PERSISTS.    penicillin v potassium (Veetid) 500 mg tablet Take 4 tablets 1 hour prior to dental procedure    rosuvastatin (CRESTOR) 20 mg, oral, Daily    zolpidem (Ambien) 5 mg tablet 0.5 tablets, oral, Nightly PRN        PHYSICAL EXAM:  There were no vitals taken for this visit.  Constitutional: Patient appears well-developed and well-nourished. No distress.    Pulmonary/Chest: Effort normal. No respiratory distress.   Abdominal: Soft, ND NT  : WNL  Musculoskeletal: Normal range of motion.    Neurological: Alert and oriented to person, place, and time.  Psychiatric: Normal mood and affect. Behavior is normal. Thought content normal.      Labs:  No results found for: \"TESTOSTERONE\"  Lab Results   Component Value Date    PSA 0.71 08/19/2022   Most recent PSA was 0.95 on 12/23/23  No components found for: \"CBC\"  Lab Results   Component Value Date    CREATININE 1.17 12/21/2023     No components found for: \"TESTOTMS\"  No results found for: \"TESTF\"    Discussion:  Multiple medical issues, non related to urology. No significant urinary complaints. Has ED but is not a priority due to issues with back pain. Pt wishes to become established with urology provider. Will continue follow up.    PVR 2  AUA 8  LETICIA  Assessment:    No diagnosis found.    Andrey Reaves is a 69 y.o. male here for NPV     Plan:   Follow up in 6 months with PSA prior and for sxs recheck  All questions and concerns were addressed. Patient verbalizes understanding and has no other questions at this time.     Scribe Attestation  By signing my name below, I, Tori Sher   attest that this documentation has been prepared under the direction and in the presence of Stefan Khan MD.  "

## 2024-04-11 ENCOUNTER — OFFICE VISIT (OUTPATIENT)
Dept: UROLOGY | Facility: HOSPITAL | Age: 69
End: 2024-04-11
Payer: MEDICARE

## 2024-04-11 DIAGNOSIS — N40.0 BENIGN PROSTATIC HYPERPLASIA WITHOUT LOWER URINARY TRACT SYMPTOMS: Primary | ICD-10-CM

## 2024-04-11 PROCEDURE — 99214 OFFICE O/P EST MOD 30 MIN: CPT | Performed by: UROLOGY

## 2024-04-11 PROCEDURE — 4010F ACE/ARB THERAPY RXD/TAKEN: CPT | Performed by: UROLOGY

## 2024-04-11 PROCEDURE — 51798 US URINE CAPACITY MEASURE: CPT | Performed by: UROLOGY

## 2024-04-16 ENCOUNTER — OFFICE VISIT (OUTPATIENT)
Dept: DERMATOLOGY | Facility: CLINIC | Age: 69
End: 2024-04-16
Payer: MEDICARE

## 2024-04-16 DIAGNOSIS — L82.1 SEBORRHEIC KERATOSIS: ICD-10-CM

## 2024-04-16 DIAGNOSIS — Z12.83 SCREENING EXAM FOR SKIN CANCER: ICD-10-CM

## 2024-04-16 DIAGNOSIS — L57.0 ACTINIC KERATOSIS: Primary | ICD-10-CM

## 2024-04-16 DIAGNOSIS — L81.4 LENTIGO: ICD-10-CM

## 2024-04-16 DIAGNOSIS — Z85.828 HISTORY OF NONMELANOMA SKIN CANCER: ICD-10-CM

## 2024-04-16 DIAGNOSIS — L30.9 DERMATITIS, UNSPECIFIED: ICD-10-CM

## 2024-04-16 PROCEDURE — 99213 OFFICE O/P EST LOW 20 MIN: CPT | Performed by: DERMATOLOGY

## 2024-04-16 PROCEDURE — 4010F ACE/ARB THERAPY RXD/TAKEN: CPT | Performed by: DERMATOLOGY

## 2024-04-16 RX ORDER — CLINDAMYCIN PHOSPHATE 11.9 MG/ML
1 SOLUTION TOPICAL DAILY
Qty: 60 ML | Refills: 11 | Status: SHIPPED | OUTPATIENT
Start: 2024-04-16

## 2024-04-16 RX ORDER — MUPIROCIN CALCIUM 20 MG/G
CREAM TOPICAL 3 TIMES DAILY
Qty: 30 G | Refills: 11 | Status: SHIPPED | OUTPATIENT
Start: 2024-04-16 | End: 2024-04-26

## 2024-04-16 NOTE — PROGRESS NOTES
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 Patient has a history of NMSC and identifies no lesions of concern today.    Review of Systems:  No other skin or systemic complaints other than what is documented elsewhere in the note.    The following portions of the chart were reviewed this encounter and updated as appropriate:         Skin Cancer History  - 08/2018: n/iBCC of R forehead s/p Mohs with Dr. Salguero 10/2018  - 2007: n/mnBCC of left shoulder s/p excision  - 2007: BCC of temple (pt unsure of laterality) s/p treatment  - AKs        Specialty Problems    None       Objective   Well appearing patient in no apparent distress; mood and affect are within normal limits.    A full examination was performed including scalp, head, eyes, ears, nose, lips, neck, chest, axillae, abdomen, back, buttocks, bilateral upper extremities, bilateral lower extremities, hands, feet, fingers, toes, fingernails, and toenails. All findings within normal limits unless otherwise noted below.    Assessment/Plan   1. Actinic keratosis  Scalp  Erythematous macules with gritty scale.    - Patient deferred treatment with cryotherapy today as he has an event coming up this weekend  - Will have pt RTC for cryotherapy    2. Lentigo  Scattered tan macules in sun-exposed areas.    - Benign finding; no intervention indicated today.      Related Procedures  Follow Up In Dermatology - Established Patient    3. Seborrheic keratosis  Stuck on verrucous, tan-brown papules and plaques.      - Benign finding; no intervention indicated today.      4. Dermatitis, unspecified  Bilateral feet  Scaling of toes, no maceration noted    - Patient reports history of foot dermatitis, superinfected in the past and well controlled with mupirocin cream and clindamycin solution. Requests refills today.    mupirocin (Bactroban) 2 % cream - Bilateral feet  Apply topically 3 times a day for 10 days.    clindamycin  (Cleocin T) 1 % external solution - Bilateral feet  Apply 1 Application topically once daily.    Related Procedures  Follow Up In Dermatology - Established Patient    5. History of nonmelanoma skin cancer    Related Procedures  Follow Up In Dermatology - Established Patient    6. Screening exam for skin cancer    Related Procedures  Follow Up In Dermatology - Established Patient      Return to clinic in 1-2 mo for cryotherapy of actinic keratoses.  Return to clinic in 1 year for FBSE.    Zakiya Shi MD, RAAD  PGY-2, Department of Dermatology    I saw and evaluated the patient. I personally obtained the key and critical portions of the history and physical exam or was physically present for key and critical portions performed by the resident/fellow. I reviewed the resident/fellow's documentation and discussed the patient with the resident/fellow. I agree with the resident/fellow's medical decision making as documented in the note and made changes where appropriate.

## 2024-04-16 NOTE — PATIENT INSTRUCTIONS
Thank you for visiting with  Dermatology today!    At your visit today, you had a full body skin exam. Your skin looks good! You had some actinic keratoses that we will plan to treat at a later date, according to your preference.    Going forward, we suggest the following:  - Wear sunscreen SPF >30, and be sure to re-apply every 1.5-2 hours when outside. Consider wearing wide-brimmed hats or UPF sun protective clothing.  Also consider avoiding the sun during the day when it is most intense, between the hours of 10AM - 2PM.

## 2024-07-03 ENCOUNTER — TELEPHONE (OUTPATIENT)
Dept: PRIMARY CARE | Facility: CLINIC | Age: 69
End: 2024-07-03

## 2024-07-17 ENCOUNTER — OFFICE VISIT (OUTPATIENT)
Dept: NEUROLOGY | Facility: HOSPITAL | Age: 69
End: 2024-07-17
Payer: MEDICARE

## 2024-07-17 VITALS
HEART RATE: 55 BPM | RESPIRATION RATE: 18 BRPM | SYSTOLIC BLOOD PRESSURE: 123 MMHG | HEIGHT: 67 IN | TEMPERATURE: 97.1 F | BODY MASS INDEX: 27 KG/M2 | WEIGHT: 172 LBS | DIASTOLIC BLOOD PRESSURE: 76 MMHG

## 2024-07-17 DIAGNOSIS — M48.061 SPINAL STENOSIS OF LUMBAR REGION, UNSPECIFIED WHETHER NEUROGENIC CLAUDICATION PRESENT: Primary | ICD-10-CM

## 2024-07-17 PROCEDURE — 3078F DIAST BP <80 MM HG: CPT | Performed by: PSYCHIATRY & NEUROLOGY

## 2024-07-17 PROCEDURE — 3074F SYST BP LT 130 MM HG: CPT | Performed by: PSYCHIATRY & NEUROLOGY

## 2024-07-17 PROCEDURE — 99213 OFFICE O/P EST LOW 20 MIN: CPT | Mod: GC | Performed by: PSYCHIATRY & NEUROLOGY

## 2024-07-17 PROCEDURE — 1125F AMNT PAIN NOTED PAIN PRSNT: CPT | Performed by: PSYCHIATRY & NEUROLOGY

## 2024-07-17 PROCEDURE — 1036F TOBACCO NON-USER: CPT | Performed by: PSYCHIATRY & NEUROLOGY

## 2024-07-17 PROCEDURE — 3008F BODY MASS INDEX DOCD: CPT | Performed by: PSYCHIATRY & NEUROLOGY

## 2024-07-17 PROCEDURE — 4010F ACE/ARB THERAPY RXD/TAKEN: CPT | Performed by: PSYCHIATRY & NEUROLOGY

## 2024-07-17 PROCEDURE — 99213 OFFICE O/P EST LOW 20 MIN: CPT | Performed by: PSYCHIATRY & NEUROLOGY

## 2024-07-17 RX ORDER — ZOLPIDEM TARTRATE 5 MG/1
5 TABLET ORAL NIGHTLY PRN
Qty: 30 TABLET | Refills: 2 | Status: SHIPPED | OUTPATIENT
Start: 2024-07-17 | End: 2024-10-15

## 2024-07-17 RX ORDER — GABAPENTIN 800 MG/1
800 TABLET ORAL 3 TIMES DAILY
Qty: 270 TABLET | Refills: 3 | Status: SHIPPED | OUTPATIENT
Start: 2024-07-17 | End: 2025-07-17

## 2024-07-17 ASSESSMENT — PAIN SCALES - GENERAL: PAINLEVEL: 2

## 2024-08-05 ENCOUNTER — TELEPHONE (OUTPATIENT)
Dept: SCHEDULING | Age: 69
End: 2024-08-05
Payer: MEDICARE

## 2024-09-11 DIAGNOSIS — Z00.00 ENCOUNTER FOR GENERAL ADULT MEDICAL EXAMINATION WITHOUT ABNORMAL FINDINGS: ICD-10-CM

## 2024-09-11 RX ORDER — METFORMIN HYDROCHLORIDE 500 MG/1
1000 TABLET, EXTENDED RELEASE ORAL 2 TIMES DAILY
Qty: 360 TABLET | Refills: 1 | Status: SHIPPED | OUTPATIENT
Start: 2024-09-11

## 2024-10-10 ENCOUNTER — TELEPHONE (OUTPATIENT)
Dept: PRIMARY CARE | Facility: CLINIC | Age: 69
End: 2024-10-10

## 2024-10-11 NOTE — TELEPHONE ENCOUNTER
So sorry I have no idea where the rest of message disappeared to but wants a referral for ortho or asked if he should see you first so you can determine maybe.

## 2024-10-13 DIAGNOSIS — M79.646 THUMB PAIN, UNSPECIFIED LATERALITY: Primary | ICD-10-CM

## 2024-10-15 ENCOUNTER — HOSPITAL ENCOUNTER (OUTPATIENT)
Dept: RADIOLOGY | Facility: HOSPITAL | Age: 69
Discharge: HOME | End: 2024-10-15
Payer: MEDICARE

## 2024-10-15 ENCOUNTER — APPOINTMENT (OUTPATIENT)
Dept: ORTHOPEDIC SURGERY | Facility: HOSPITAL | Age: 69
End: 2024-10-15
Payer: MEDICARE

## 2024-10-15 ENCOUNTER — OFFICE VISIT (OUTPATIENT)
Dept: ORTHOPEDIC SURGERY | Facility: HOSPITAL | Age: 69
End: 2024-10-15
Payer: MEDICARE

## 2024-10-15 ENCOUNTER — APPOINTMENT (OUTPATIENT)
Dept: RADIOLOGY | Facility: HOSPITAL | Age: 69
End: 2024-10-15
Payer: MEDICARE

## 2024-10-15 DIAGNOSIS — M79.645 PAIN OF LEFT THUMB: ICD-10-CM

## 2024-10-15 DIAGNOSIS — M25.512 LEFT SHOULDER PAIN, UNSPECIFIED CHRONICITY: ICD-10-CM

## 2024-10-15 PROCEDURE — 73140 X-RAY EXAM OF FINGER(S): CPT | Mod: LT

## 2024-10-15 PROCEDURE — 99215 OFFICE O/P EST HI 40 MIN: CPT | Performed by: ORTHOPAEDIC SURGERY

## 2024-10-15 PROCEDURE — 4010F ACE/ARB THERAPY RXD/TAKEN: CPT | Performed by: ORTHOPAEDIC SURGERY

## 2024-10-15 PROCEDURE — 73030 X-RAY EXAM OF SHOULDER: CPT | Mod: LT

## 2024-10-15 PROCEDURE — 1159F MED LIST DOCD IN RCRD: CPT | Performed by: ORTHOPAEDIC SURGERY

## 2024-10-15 PROCEDURE — 73030 X-RAY EXAM OF SHOULDER: CPT | Mod: LEFT SIDE | Performed by: RADIOLOGY

## 2024-10-15 PROCEDURE — 73140 X-RAY EXAM OF FINGER(S): CPT | Mod: LEFT SIDE | Performed by: RADIOLOGY

## 2024-10-15 NOTE — PROGRESS NOTES
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  He has been doing fairly well with regard to shoulder function but was concerned about possibly increasing the stress on his shoulder with increased activities.  The patient is pleasant and cooperative.  The patient is alert and oriented ×3.  Auditory function is intact.  The patient is a good historian.  The patient is not in acute distress.  Eye exam significant for nonicteric sclera, intact ocular muscle movement.  Breathing is rhythmic symmetric and nonlabored.  Left radial pulses are palpable brisk cap refill light touch sensation intact integument intact with exception of well-healed surgical scars anteriorly and cephalad.  Patient has fairly good range of motion of the shoulder can elevate to 170 degrees external rotation of 90 degrees at 90 degrees of abduction without apprehension his motor power is well-preserved in abduction internal/external rotation grade 5/5,  strength 5/5.    X-rays of the shoulder reveal hardware consistent with a clavicle fracture repair and an anterior stabilization.  No evidence of loss of fixation of the hardware.  Glenohumeral joint space is slightly narrowed inferiorly with small inferior osteophyte subacromial space is well-preserved.    Left shoulder pain    Patient has minimal left shoulder symptoms and I recommended he progress activities as tolerated with no specific restrictions.  Prognosis for his shoulder is very good.  At some point he may require further intervention for the early arthritis but I do not recommend any intervention at this time.    Patient also asked me evaluate his left thumb he fell several weeks ago and has had soreness at the distal interphalangeal joint of his thumb.  He does have arthritis of the carpometacarpal joint.  That joint is not bothering him  at this time.    Exam of the left thumb reveals slight tenderness on the sides of the interphalangeal joint.  He has good interphalangeal joint flexion of 60 degrees and full extension and can maintain extension and flexion against resistance.  Capillary refill is brisk.  Integument is intact.    Radiographs of the thumb reveal slightly narrowed interphalangeal joint.  No evidence of fracture dislocation or subluxation.    Left thumb IP joint arthritis    It does not appear that the patient is caused any significant injury to the thumb I recommend he resume activities as tolerated no specific intervention.    This was dictated using voice recognition software and not corrected for grammatical or spelling errors.

## 2024-10-16 ENCOUNTER — APPOINTMENT (OUTPATIENT)
Dept: ORTHOPEDIC SURGERY | Facility: HOSPITAL | Age: 69
End: 2024-10-16
Payer: MEDICARE

## 2024-10-16 ENCOUNTER — TELEPHONE (OUTPATIENT)
Dept: SCHEDULING | Age: 69
End: 2024-10-16
Payer: MEDICARE

## 2024-10-16 DIAGNOSIS — I25.10 ASHD (ARTERIOSCLEROTIC HEART DISEASE): ICD-10-CM

## 2024-10-16 RX ORDER — DRONEDARONE 400 MG/1
400 TABLET, FILM COATED ORAL
Qty: 180 TABLET | Refills: 1 | Status: SHIPPED | OUTPATIENT
Start: 2024-10-16 | End: 2025-10-16

## 2024-10-22 ENCOUNTER — APPOINTMENT (OUTPATIENT)
Dept: DERMATOLOGY | Facility: CLINIC | Age: 69
End: 2024-10-22
Payer: MEDICARE

## 2024-10-22 DIAGNOSIS — D22.62 MELANOCYTIC NEVUS OF LEFT SHOULDER: ICD-10-CM

## 2024-10-22 DIAGNOSIS — L57.0 ACTINIC KERATOSIS: Primary | ICD-10-CM

## 2024-10-22 DIAGNOSIS — L57.8 ACTINIC SKIN DAMAGE: ICD-10-CM

## 2024-10-22 DIAGNOSIS — L30.9 DERMATITIS, UNSPECIFIED: ICD-10-CM

## 2024-10-22 PROCEDURE — 1159F MED LIST DOCD IN RCRD: CPT | Performed by: DERMATOLOGY

## 2024-10-22 PROCEDURE — 4010F ACE/ARB THERAPY RXD/TAKEN: CPT | Performed by: DERMATOLOGY

## 2024-10-22 PROCEDURE — 99213 OFFICE O/P EST LOW 20 MIN: CPT | Performed by: DERMATOLOGY

## 2024-10-22 PROCEDURE — 17000 DESTRUCT PREMALG LESION: CPT | Performed by: DERMATOLOGY

## 2024-10-22 PROCEDURE — 17003 DESTRUCT PREMALG LES 2-14: CPT | Performed by: DERMATOLOGY

## 2024-10-22 RX ORDER — TRIAMCINOLONE ACETONIDE 0.25 MG/G
CREAM TOPICAL
Qty: 80 G | Refills: 2 | Status: SHIPPED | OUTPATIENT
Start: 2024-10-22

## 2024-10-22 RX ORDER — MUPIROCIN CALCIUM 20 MG/G
CREAM TOPICAL 3 TIMES DAILY
Qty: 30 G | Refills: 3 | Status: SHIPPED | OUTPATIENT
Start: 2024-10-22 | End: 2024-11-01

## 2024-10-22 RX ORDER — CLINDAMYCIN PHOSPHATE 11.9 MG/ML
1 SOLUTION TOPICAL DAILY
Qty: 60 ML | Refills: 11 | Status: SHIPPED | OUTPATIENT
Start: 2024-10-22

## 2024-10-22 RX ORDER — FLUOROURACIL 50 MG/G
CREAM TOPICAL DAILY
Qty: 40 G | Refills: 0 | Status: SHIPPED | OUTPATIENT
Start: 2024-10-22 | End: 2024-11-05

## 2024-10-22 ASSESSMENT — DERMATOLOGY QUALITY OF LIFE (QOL) ASSESSMENT
RATE HOW BOTHERED YOU ARE BY EFFECTS OF YOUR SKIN PROBLEMS ON YOUR ACTIVITIES (EG, GOING OUT, ACCOMPLISHING WHAT YOU WANT, WORK ACTIVITIES OR YOUR RELATIONSHIPS WITH OTHERS): 0 - NEVER BOTHERED
DATE THE QUALITY-OF-LIFE ASSESSMENT WAS COMPLETED: 67135
RATE HOW BOTHERED YOU ARE BY SYMPTOMS OF YOUR SKIN PROBLEM (EG, ITCHING, STINGING BURNING, HURTING OR SKIN IRRITATION): 0 - NEVER BOTHERED
RATE HOW EMOTIONALLY BOTHERED YOU ARE BY YOUR SKIN PROBLEM (FOR EXAMPLE, WORRY, EMBARRASSMENT, FRUSTRATION): 0 - NEVER BOTHERED
ARE THERE EXCLUSIONS OR EXCEPTIONS FOR THE QUALITY OF LIFE ASSESSMENT: NO

## 2024-10-22 ASSESSMENT — DERMATOLOGY PATIENT ASSESSMENT
ARE YOU AN ORGAN TRANSPLANT RECIPIENT: NO
HAVE YOU HAD OR DO YOU HAVE VASCULAR DISEASE: YES
DO YOU USE A TANNING BED: NO
HAVE YOU HAD OR DO YOU HAVE A STAPH INFECTION: NO
DO YOU HAVE ANY NEW OR CHANGING LESIONS: YES
DO YOU USE SUNSCREEN: OCCASIONALLY
WHERE ARE THESE NEW OR CHANGING LESIONS LOCATED: SHOULDER

## 2024-10-22 ASSESSMENT — PATIENT GLOBAL ASSESSMENT (PGA): PATIENT GLOBAL ASSESSMENT: PATIENT GLOBAL ASSESSMENT:  1 - CLEAR

## 2024-10-22 ASSESSMENT — ITCH NUMERIC RATING SCALE: HOW SEVERE IS YOUR ITCHING?: 0

## 2024-10-22 NOTE — PATIENT INSTRUCTIONS
Thank you for visiting with  Dermatology today!    At your visit today, we treated your actinic keratoses (pre-cancers) with liquid nitrogen and evaluated a spot on your shoulder.    Going forward, we suggest the following:  - Wear sunscreen SPF >30, and be sure to re-apply every 1.5-2 hours when outside. Consider wearing wide-brimmed hats or UPF sun protective clothing.  Also consider avoiding the sun during the day when it is most intense, between the hours of 10AM - 2PM.  - Efudex therapy sent to your pharmacy today; recommend treating during the winter months, during a two week period where you do not have photos/prior engagements/meetings:    Week 1: apply 5-fluorouracil cream once daily in the morning to scalp and forehead  Week 2: apply 5-fluorouracil cream once daily in the morning; apply triamcinolone 0.025% cream to same area at bedtime  Week 3: apply triamcinolone 0.025% cream to treatment area at bedtime

## 2024-10-22 NOTE — PROGRESS NOTES
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     At [facility], patient deferred cryotherapy of lesions on his forehead and would like them treated today. Secondarily, the patient notes a lesion on his left shoulder that he recently noticed. It does not bother him but he would like it evaluated as he reports a significant sun exposure history in the past.    Review of Systems:  No other skin or systemic complaints other than what is documented elsewhere in the note.    Skin Cancer History  No skin cancer on file.      Specialty Problems    None       Objective   Well appearing patient in no apparent distress; mood and affect are within normal limits.    A focused skin examination was performed. All findings within normal limits unless otherwise noted below.    Assessment/Plan   1. Actinic keratosis (8)  Scalp, forehead (8)  Erythematous macules with gritty scale.    - Will treat with LN2 today    Destr of lesion - Scalp, forehead (8)  Complexity: simple    Destruction method: cryotherapy    Informed consent: discussed and consent obtained    Lesion destroyed using liquid nitrogen: Yes    Region frozen until ice ball extended beyond lesion: Yes    Cryotherapy cycles:  1  Outcome: patient tolerated procedure well with no complications    Post-procedure details: wound care instructions given      2. Actinic skin damage  Head - Anterior (Face)    - Recommend field therapy with 5-fluorouracil  - Counseled patient that he should treat during the winter months for a 2 week treatment course:  Week 1: apply 5-fluorouracil cream once daily in the morning   Week 2: apply 5-fluorouracil cream once daily in the morning; apply triamcinolone 0.025% cream to same area at bedtime  Week 3: apply triamcinolone 0.025% cream to treatment area at bedtime      fluorouracil (Efudex) 5 % cream cream - Head - Anterior (Face)  Apply topically once  daily for 14 days. Apply to the affected leonardo of the scalp and forehead once daily for a total of 2 weeks (Weeks 1 & 2). Wash hands thoroughly after each application.    triamcinolone (Kenalog) 0.025 % cream - Head - Anterior (Face)  Apply at bedtime to treatment areas for two weeks (weeks 2 & 3)    3. Melanocytic nevus of left shoulder  Left Shoulder - Anterior  - On the left anterior shoulder, there is a 3mm light brown papule with reassuring features on dermoscopy    -Benign finding; no intervention indicated today.      4. Dermatitis, unspecified  Bilateral feet  Patient states scaling of toes    - Patient reports history of foot dermatitis, superinfected in the past and well controlled with mupirocin cream and clindamycin solution. Requests refills today.    mupirocin (Bactroban) 2 % cream - Bilateral feet  Apply topically 3 times a day for 10 days.    Related Medications  clindamycin (Cleocin T) 1 % external solution  Apply 1 Application topically once daily.      RTC as previously scheduled for FBSE.    Zakiya Shi MD, RAAD  PGY-3, Department of Dermatology    I saw and evaluated the patient. I personally obtained the key and critical portions of the history and physical exam or was physically present for key and critical portions performed by the resident/fellow. I reviewed the resident/fellow's documentation and discussed the patient with the resident/fellow. I agree with the resident/fellow's medical decision making as documented in the note and made changes where appropriate.

## 2024-11-19 ENCOUNTER — APPOINTMENT (OUTPATIENT)
Dept: PRIMARY CARE | Facility: CLINIC | Age: 69
End: 2024-11-19
Payer: MEDICARE

## 2024-11-19 VITALS
DIASTOLIC BLOOD PRESSURE: 74 MMHG | SYSTOLIC BLOOD PRESSURE: 124 MMHG | WEIGHT: 171 LBS | HEIGHT: 67 IN | BODY MASS INDEX: 26.84 KG/M2

## 2024-11-19 DIAGNOSIS — I10 PRIMARY HYPERTENSION: ICD-10-CM

## 2024-11-19 DIAGNOSIS — E11.9 TYPE 2 DIABETES MELLITUS WITHOUT COMPLICATION, WITHOUT LONG-TERM CURRENT USE OF INSULIN (MULTI): ICD-10-CM

## 2024-11-19 DIAGNOSIS — Z00.00 HEALTH CARE MAINTENANCE: Primary | ICD-10-CM

## 2024-11-19 DIAGNOSIS — N40.0 BENIGN PROSTATIC HYPERPLASIA, UNSPECIFIED WHETHER LOWER URINARY TRACT SYMPTOMS PRESENT: ICD-10-CM

## 2024-11-19 DIAGNOSIS — Z12.11 ENCOUNTER FOR SCREENING FOR MALIGNANT NEOPLASM OF COLON: ICD-10-CM

## 2024-11-19 DIAGNOSIS — E11.69 DYSLIPIDEMIA ASSOCIATED WITH TYPE 2 DIABETES MELLITUS (MULTI): ICD-10-CM

## 2024-11-19 DIAGNOSIS — G47.33 OBSTRUCTIVE SLEEP APNEA: ICD-10-CM

## 2024-11-19 DIAGNOSIS — I48.0 PAROXYSMAL ATRIAL FIBRILLATION (MULTI): ICD-10-CM

## 2024-11-19 DIAGNOSIS — E78.5 DYSLIPIDEMIA ASSOCIATED WITH TYPE 2 DIABETES MELLITUS (MULTI): ICD-10-CM

## 2024-11-19 PROCEDURE — 99214 OFFICE O/P EST MOD 30 MIN: CPT | Performed by: INTERNAL MEDICINE

## 2024-11-19 PROCEDURE — 1159F MED LIST DOCD IN RCRD: CPT | Performed by: INTERNAL MEDICINE

## 2024-11-19 PROCEDURE — 3008F BODY MASS INDEX DOCD: CPT | Performed by: INTERNAL MEDICINE

## 2024-11-19 PROCEDURE — 3074F SYST BP LT 130 MM HG: CPT | Performed by: INTERNAL MEDICINE

## 2024-11-19 PROCEDURE — 3078F DIAST BP <80 MM HG: CPT | Performed by: INTERNAL MEDICINE

## 2024-11-19 PROCEDURE — 1036F TOBACCO NON-USER: CPT | Performed by: INTERNAL MEDICINE

## 2024-11-19 PROCEDURE — G0439 PPPS, SUBSEQ VISIT: HCPCS | Performed by: INTERNAL MEDICINE

## 2024-11-19 PROCEDURE — 1170F FXNL STATUS ASSESSED: CPT | Performed by: INTERNAL MEDICINE

## 2024-11-19 PROCEDURE — 4010F ACE/ARB THERAPY RXD/TAKEN: CPT | Performed by: INTERNAL MEDICINE

## 2024-11-19 PROCEDURE — 1160F RVW MEDS BY RX/DR IN RCRD: CPT | Performed by: INTERNAL MEDICINE

## 2024-11-19 NOTE — PROGRESS NOTES
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    HPI CPE see updated front sheet no chest pain no shortness of breath no side effect with medication no polyuria polydipsia does not appear to have been in A-fib much but maintains the anticoagulation medication    Past medical history ASCVD diabetes mellitus chronic kidney disease hypertension hyperlipidemia TEDDY atrial fibrillation    Medications noted and unchanged including Eliquis and diltiazem    Allergies hydrocodone see EMR    Social history no tobacco    Family history noted and unchanged    Prevention exercises regularly some prior blood work reviewed follows up cardiology Neurology no recent colonoscopy    Depression screen not depressed    Review of Systems    Objective   There were no vitals taken for this visit.    Physical Exam vital signs noted alert and oriented x 3 NCAT TM normal bilateral EAC clear bilateral no AC nodes no JVD no thyromegaly chest clear to auscultation percussion CV regular rate and rhythm S1-S2 without murmur gallop rub or skip except for 1/6 systolic ejection murmur abdomen soft nontender normal active bowel sounds LS spine normal curvature negative straight leg raise extremities no clubbing cyanosis or edema normal distal pulses DTR 2+ PERRLA EOMI nares without discharge OP benign    Assessment/Plan impression General Medical examination diabetes mellitus hypertension hyperlipidemia basically other diagnoses bph paf  Plan check laboratory results see below check comprehensive panel visit today is negative function as well as liver function check CBC advised on blood count check lipid panel advised on cholesterol profile check A1c advised on long-term blood work Check PSA advised on prostate and follow-up  Cardiovascular pulm follow-up cardiac not in afib now  Okay for colonoscopy req to be made)  Okay for jury duty letter (sent)  Discussed with Dr. Hellen Casillas  weight loss exercise good water consumption  Recheck more regularly based on labs TT 50 cc 26     PERRLA EOMI nares without discharge OP benign TM normal bilateral EAC clear bilateral no AC nodes      Plan physical examination (check) jury duty letter   (Check)  blood work (check) colonoscopy (check)

## 2024-12-01 ASSESSMENT — ACTIVITIES OF DAILY LIVING (ADL)
DRESSING: INDEPENDENT
TAKING_MEDICATION: INDEPENDENT
BATHING: INDEPENDENT
MANAGING_FINANCES: INDEPENDENT
DOING_HOUSEWORK: INDEPENDENT
GROCERY_SHOPPING: INDEPENDENT

## 2024-12-01 ASSESSMENT — ENCOUNTER SYMPTOMS
DEPRESSION: 0
OCCASIONAL FEELINGS OF UNSTEADINESS: 0
LOSS OF SENSATION IN FEET: 0

## 2024-12-01 ASSESSMENT — PATIENT HEALTH QUESTIONNAIRE - PHQ9
1. LITTLE INTEREST OR PLEASURE IN DOING THINGS: NOT AT ALL
2. FEELING DOWN, DEPRESSED OR HOPELESS: NOT AT ALL
SUM OF ALL RESPONSES TO PHQ9 QUESTIONS 1 AND 2: 0

## 2024-12-20 ENCOUNTER — LAB (OUTPATIENT)
Dept: LAB | Facility: LAB | Age: 69
End: 2024-12-20
Payer: MEDICARE

## 2024-12-20 ENCOUNTER — TELEPHONE (OUTPATIENT)
Dept: PRIMARY CARE | Facility: CLINIC | Age: 69
End: 2024-12-20

## 2024-12-20 DIAGNOSIS — Z00.00 HEALTH CARE MAINTENANCE: ICD-10-CM

## 2024-12-20 DIAGNOSIS — E11.9 TYPE 2 DIABETES MELLITUS WITHOUT COMPLICATION, WITHOUT LONG-TERM CURRENT USE OF INSULIN (MULTI): ICD-10-CM

## 2024-12-20 DIAGNOSIS — G47.33 OBSTRUCTIVE SLEEP APNEA: ICD-10-CM

## 2024-12-20 DIAGNOSIS — I10 PRIMARY HYPERTENSION: ICD-10-CM

## 2024-12-20 LAB
ALBUMIN SERPL BCP-MCNC: 4.5 G/DL (ref 3.4–5)
ALP SERPL-CCNC: 52 U/L (ref 33–136)
ALT SERPL W P-5'-P-CCNC: 38 U/L (ref 10–52)
ANION GAP SERPL CALC-SCNC: 11 MMOL/L (ref 10–20)
AST SERPL W P-5'-P-CCNC: 25 U/L (ref 9–39)
BILIRUB SERPL-MCNC: 0.9 MG/DL (ref 0–1.2)
BUN SERPL-MCNC: 20 MG/DL (ref 6–23)
CALCIUM SERPL-MCNC: 9.6 MG/DL (ref 8.6–10.3)
CHLORIDE SERPL-SCNC: 106 MMOL/L (ref 98–107)
CHOLEST SERPL-MCNC: 106 MG/DL (ref 0–199)
CHOLESTEROL/HDL RATIO: 3
CO2 SERPL-SCNC: 28 MMOL/L (ref 21–32)
CREAT SERPL-MCNC: 1.37 MG/DL (ref 0.5–1.3)
EGFRCR SERPLBLD CKD-EPI 2021: 55 ML/MIN/1.73M*2
ERYTHROCYTE [DISTWIDTH] IN BLOOD BY AUTOMATED COUNT: 13.7 % (ref 11.5–14.5)
EST. AVERAGE GLUCOSE BLD GHB EST-MCNC: 146 MG/DL
GLUCOSE SERPL-MCNC: 114 MG/DL (ref 74–99)
HBA1C MFR BLD: 6.7 %
HCT VFR BLD AUTO: 48.2 % (ref 41–52)
HDLC SERPL-MCNC: 35.3 MG/DL
HGB BLD-MCNC: 15.8 G/DL (ref 13.5–17.5)
LDLC SERPL CALC-MCNC: 31 MG/DL
MCH RBC QN AUTO: 29.7 PG (ref 26–34)
MCHC RBC AUTO-ENTMCNC: 32.8 G/DL (ref 32–36)
MCV RBC AUTO: 91 FL (ref 80–100)
NON HDL CHOLESTEROL: 71 MG/DL (ref 0–149)
NRBC BLD-RTO: 0 /100 WBCS (ref 0–0)
PLATELET # BLD AUTO: 187 X10*3/UL (ref 150–450)
POTASSIUM SERPL-SCNC: 4.8 MMOL/L (ref 3.5–5.3)
PROT SERPL-MCNC: 6.7 G/DL (ref 6.4–8.2)
PSA SERPL-MCNC: 0.74 NG/ML
RBC # BLD AUTO: 5.32 X10*6/UL (ref 4.5–5.9)
SODIUM SERPL-SCNC: 140 MMOL/L (ref 136–145)
TRIGL SERPL-MCNC: 200 MG/DL (ref 0–149)
VLDL: 40 MG/DL (ref 0–40)
WBC # BLD AUTO: 6.7 X10*3/UL (ref 4.4–11.3)

## 2024-12-20 PROCEDURE — 80053 COMPREHEN METABOLIC PANEL: CPT

## 2024-12-20 PROCEDURE — 36415 COLL VENOUS BLD VENIPUNCTURE: CPT

## 2024-12-20 PROCEDURE — 85027 COMPLETE CBC AUTOMATED: CPT

## 2024-12-20 PROCEDURE — 80061 LIPID PANEL: CPT

## 2024-12-20 PROCEDURE — 83036 HEMOGLOBIN GLYCOSYLATED A1C: CPT

## 2024-12-20 PROCEDURE — 84153 ASSAY OF PSA TOTAL: CPT

## 2024-12-30 DIAGNOSIS — I48.0 PAROXYSMAL ATRIAL FIBRILLATION (MULTI): ICD-10-CM

## 2024-12-30 RX ORDER — APIXABAN 5 MG/1
5 TABLET, FILM COATED ORAL EVERY 12 HOURS
Qty: 180 TABLET | Refills: 2 | Status: SHIPPED | OUTPATIENT
Start: 2024-12-30 | End: 2025-12-30

## 2025-01-23 ENCOUNTER — TELEPHONE (OUTPATIENT)
Dept: SCHEDULING | Age: 70
End: 2025-01-23
Payer: MEDICARE

## 2025-01-27 NOTE — TELEPHONE ENCOUNTER
Dr José signed requested paperwork. Contacted patient and patient provided update [facility]  paperwork

## 2025-02-05 DIAGNOSIS — I25.10 ASHD (ARTERIOSCLEROTIC HEART DISEASE): ICD-10-CM

## 2025-02-05 DIAGNOSIS — E11.69 DYSLIPIDEMIA ASSOCIATED WITH TYPE 2 DIABETES MELLITUS (MULTI): ICD-10-CM

## 2025-02-05 DIAGNOSIS — E78.5 DYSLIPIDEMIA ASSOCIATED WITH TYPE 2 DIABETES MELLITUS (MULTI): ICD-10-CM

## 2025-02-05 RX ORDER — ROSUVASTATIN CALCIUM 20 MG/1
20 TABLET, COATED ORAL DAILY
Qty: 90 TABLET | Refills: 3 | Status: SHIPPED | OUTPATIENT
Start: 2025-02-05

## 2025-02-05 RX ORDER — LISINOPRIL 20 MG/1
20 TABLET ORAL DAILY
Qty: 90 TABLET | Refills: 3 | Status: SHIPPED | OUTPATIENT
Start: 2025-02-05

## 2025-03-06 DIAGNOSIS — I48.0 PAROXYSMAL ATRIAL FIBRILLATION (MULTI): ICD-10-CM

## 2025-03-06 DIAGNOSIS — I25.10 ASHD (ARTERIOSCLEROTIC HEART DISEASE): ICD-10-CM

## 2025-03-06 RX ORDER — NITROGLYCERIN 0.4 MG/1
0.4 TABLET SUBLINGUAL EVERY 5 MIN PRN
Qty: 100 TABLET | Refills: 1 | Status: SHIPPED | OUTPATIENT
Start: 2025-03-06

## 2025-03-06 RX ORDER — APIXABAN 5 MG/1
5 TABLET, FILM COATED ORAL EVERY 12 HOURS
Qty: 180 TABLET | Refills: 3 | Status: SHIPPED | OUTPATIENT
Start: 2025-03-06 | End: 2026-03-06

## 2025-03-06 RX ORDER — DRONEDARONE 400 MG/1
400 TABLET, FILM COATED ORAL
Qty: 180 TABLET | Refills: 3 | Status: SHIPPED | OUTPATIENT
Start: 2025-03-06 | End: 2026-03-06

## 2025-03-07 DIAGNOSIS — I48.0 PAROXYSMAL ATRIAL FIBRILLATION (MULTI): ICD-10-CM

## 2025-03-08 RX ORDER — DILTIAZEM HYDROCHLORIDE 60 MG/1
TABLET, FILM COATED ORAL
Qty: 90 TABLET | Refills: 1 | Status: SHIPPED | OUTPATIENT
Start: 2025-03-08

## 2025-03-09 DIAGNOSIS — Z00.00 ENCOUNTER FOR GENERAL ADULT MEDICAL EXAMINATION WITHOUT ABNORMAL FINDINGS: ICD-10-CM

## 2025-03-10 RX ORDER — METFORMIN HYDROCHLORIDE 500 MG/1
1000 TABLET, EXTENDED RELEASE ORAL 2 TIMES DAILY
Qty: 360 TABLET | Refills: 1 | Status: SHIPPED | OUTPATIENT
Start: 2025-03-10

## 2025-04-16 ENCOUNTER — APPOINTMENT (OUTPATIENT)
Dept: DERMATOLOGY | Facility: CLINIC | Age: 70
End: 2025-04-16
Payer: MEDICARE

## 2025-06-11 DIAGNOSIS — M48.061 SPINAL STENOSIS OF LUMBAR REGION, UNSPECIFIED WHETHER NEUROGENIC CLAUDICATION PRESENT: ICD-10-CM

## 2025-06-11 RX ORDER — GABAPENTIN 800 MG/1
800 TABLET ORAL 3 TIMES DAILY
Qty: 270 TABLET | Refills: 3 | Status: SHIPPED | OUTPATIENT
Start: 2025-06-11 | End: 2026-06-11

## 2025-06-17 ENCOUNTER — TELEPHONE (OUTPATIENT)
Dept: DERMATOLOGY | Facility: CLINIC | Age: 70
End: 2025-06-17
Payer: MEDICARE

## 2025-06-17 NOTE — TELEPHONE ENCOUNTER
Patient called about his efudex treatment. Patient used it for 3 weeks instead of 2, and scabs started developing on his face and scalp. Patient wanted to know if he should cover them with makeup or do anything differently since he is attending a wedding in 3 days. Patient was advised the scabbing was the usual effect. 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 Patient also scheduled skin check for September but wanted to know if Dr. Graham needed to see him sooner since completing the efudex treatment.

## 2025-06-18 NOTE — TELEPHONE ENCOUNTER
Called patient back to give him information from Dr. Graham regarding using makeup on the scabs and September being fine for a follow up.

## 2025-07-28 ENCOUNTER — TELEPHONE (OUTPATIENT)
Dept: PRIMARY CARE | Facility: CLINIC | Age: 70
End: 2025-07-28

## 2025-09-02 ENCOUNTER — OFFICE VISIT (OUTPATIENT)
Dept: DERMATOLOGY | Facility: CLINIC | Age: 70
End: 2025-09-02
Payer: MEDICARE

## 2025-09-02 DIAGNOSIS — D22.62 MELANOCYTIC NEVUS OF LEFT SHOULDER: ICD-10-CM

## 2025-09-02 DIAGNOSIS — L57.8 ACTINIC SKIN DAMAGE: Primary | ICD-10-CM

## 2025-09-02 DIAGNOSIS — L82.1 SEBORRHEIC KERATOSIS: ICD-10-CM

## 2025-09-02 DIAGNOSIS — L73.8 SEBACEOUS HYPERPLASIA OF FACE: ICD-10-CM

## 2025-09-02 DIAGNOSIS — L81.4 LENTIGO: ICD-10-CM

## 2025-09-02 PROCEDURE — 99213 OFFICE O/P EST LOW 20 MIN: CPT | Performed by: DERMATOLOGY

## 2025-09-02 PROCEDURE — 1159F MED LIST DOCD IN RCRD: CPT | Performed by: DERMATOLOGY

## 2025-09-02 PROCEDURE — 4010F ACE/ARB THERAPY RXD/TAKEN: CPT | Performed by: DERMATOLOGY

## 2025-09-02 ASSESSMENT — PATIENT GLOBAL ASSESSMENT (PGA): PATIENT GLOBAL ASSESSMENT: PATIENT GLOBAL ASSESSMENT:  1 - CLEAR

## 2025-09-02 ASSESSMENT — DERMATOLOGY PATIENT ASSESSMENT
HAVE YOU HAD OR DO YOU HAVE VASCULAR DISEASE: YES
ARE YOU AN ORGAN TRANSPLANT RECIPIENT: NO
DO YOU USE SUNSCREEN: OCCASIONALLY
DO YOU USE A TANNING BED: NO
HAVE YOU HAD OR DO YOU HAVE A STAPH INFECTION: NO
DO YOU HAVE ANY NEW OR CHANGING LESIONS: NO

## 2025-09-02 ASSESSMENT — ITCH NUMERIC RATING SCALE: HOW SEVERE IS YOUR ITCHING?: 0

## 2025-09-02 ASSESSMENT — DERMATOLOGY QUALITY OF LIFE (QOL) ASSESSMENT
DATE THE QUALITY-OF-LIFE ASSESSMENT WAS COMPLETED: 67450
WHAT SINGLE SKIN CONDITION LISTED BELOW IS THE PATIENT ANSWERING THE QUALITY-OF-LIFE ASSESSMENT QUESTIONS ABOUT: NONE OF THE ABOVE
RATE HOW BOTHERED YOU ARE BY SYMPTOMS OF YOUR SKIN PROBLEM (EG, ITCHING, STINGING BURNING, HURTING OR SKIN IRRITATION): 0 - NEVER BOTHERED
ARE THERE EXCLUSIONS OR EXCEPTIONS FOR THE QUALITY OF LIFE ASSESSMENT: NO
RATE HOW BOTHERED YOU ARE BY EFFECTS OF YOUR SKIN PROBLEMS ON YOUR ACTIVITIES (EG, GOING OUT, ACCOMPLISHING WHAT YOU WANT, WORK ACTIVITIES OR YOUR RELATIONSHIPS WITH OTHERS): 0 - NEVER BOTHERED
RATE HOW EMOTIONALLY BOTHERED YOU ARE BY YOUR SKIN PROBLEM (FOR EXAMPLE, WORRY, EMBARRASSMENT, FRUSTRATION): 0 - NEVER BOTHERED

## 2025-09-05 ENCOUNTER — OFFICE VISIT (OUTPATIENT)
Dept: ORTHOPEDIC SURGERY | Facility: HOSPITAL | Age: 70
End: 2025-09-05
Payer: MEDICARE

## 2025-09-05 ENCOUNTER — HOSPITAL ENCOUNTER (OUTPATIENT)
Dept: RADIOLOGY | Facility: HOSPITAL | Age: 70
Discharge: HOME | End: 2025-09-05
Payer: MEDICARE

## 2025-09-05 VITALS — WEIGHT: 168 LBS | HEIGHT: 67 IN | BODY MASS INDEX: 26.37 KG/M2

## 2025-09-05 DIAGNOSIS — M25.511 RIGHT SHOULDER PAIN, UNSPECIFIED CHRONICITY: ICD-10-CM

## 2025-09-05 DIAGNOSIS — I48.0 PAROXYSMAL ATRIAL FIBRILLATION (MULTI): ICD-10-CM

## 2025-09-05 PROCEDURE — 73030 X-RAY EXAM OF SHOULDER: CPT | Mod: RT

## 2025-09-05 PROCEDURE — 73030 X-RAY EXAM OF SHOULDER: CPT | Mod: RIGHT SIDE | Performed by: RADIOLOGY

## 2025-09-05 PROCEDURE — 1125F AMNT PAIN NOTED PAIN PRSNT: CPT | Performed by: ORTHOPAEDIC SURGERY

## 2025-09-05 PROCEDURE — 99214 OFFICE O/P EST MOD 30 MIN: CPT | Performed by: ORTHOPAEDIC SURGERY

## 2025-09-05 PROCEDURE — 99212 OFFICE O/P EST SF 10 MIN: CPT

## 2025-09-05 PROCEDURE — 1159F MED LIST DOCD IN RCRD: CPT | Performed by: ORTHOPAEDIC SURGERY

## 2025-09-05 PROCEDURE — 3008F BODY MASS INDEX DOCD: CPT | Performed by: ORTHOPAEDIC SURGERY

## 2025-09-05 PROCEDURE — 4010F ACE/ARB THERAPY RXD/TAKEN: CPT | Performed by: ORTHOPAEDIC SURGERY

## 2025-09-05 RX ORDER — DILTIAZEM HYDROCHLORIDE 60 MG/1
60 TABLET, FILM COATED ORAL
Qty: 90 TABLET | Refills: 1 | Status: SHIPPED | OUTPATIENT
Start: 2025-09-05

## 2025-09-05 ASSESSMENT — PAIN - FUNCTIONAL ASSESSMENT: PAIN_FUNCTIONAL_ASSESSMENT: 0-10

## 2025-09-05 ASSESSMENT — PAIN SCALES - GENERAL: PAINLEVEL_OUTOF10: 2

## 2025-09-10 ENCOUNTER — APPOINTMENT (OUTPATIENT)
Dept: DERMATOLOGY | Facility: CLINIC | Age: 70
End: 2025-09-10
Payer: MEDICARE

## 2025-11-05 ENCOUNTER — APPOINTMENT (OUTPATIENT)
Dept: DERMATOLOGY | Facility: CLINIC | Age: 70
End: 2025-11-05
Payer: MEDICARE

## 2026-09-02 ENCOUNTER — APPOINTMENT (OUTPATIENT)
Dept: DERMATOLOGY | Facility: CLINIC | Age: 71
End: 2026-09-02
Payer: MEDICARE